# Patient Record
Sex: MALE | Race: OTHER | HISPANIC OR LATINO | URBAN - METROPOLITAN AREA
[De-identification: names, ages, dates, MRNs, and addresses within clinical notes are randomized per-mention and may not be internally consistent; named-entity substitution may affect disease eponyms.]

---

## 2024-10-25 ENCOUNTER — EMERGENCY (EMERGENCY)
Facility: HOSPITAL | Age: 39
LOS: 0 days | Discharge: HOME | End: 2024-10-25
Attending: EMERGENCY MEDICINE
Payer: COMMERCIAL

## 2024-10-25 VITALS
TEMPERATURE: 98 F | WEIGHT: 205.03 LBS | HEIGHT: 70 IN | SYSTOLIC BLOOD PRESSURE: 150 MMHG | RESPIRATION RATE: 20 BRPM | DIASTOLIC BLOOD PRESSURE: 87 MMHG | OXYGEN SATURATION: 97 % | HEART RATE: 94 BPM
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
## 2024-10-25 DIAGNOSIS — S61.012A LACERATION WITHOUT FOREIGN BODY OF LEFT THUMB WITHOUT DAMAGE TO NAIL, INITIAL ENCOUNTER: ICD-10-CM
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
## 2024-10-25 PROBLEM — Z78.9 OTHER SPECIFIED HEALTH STATUS: Chronic | Status: ACTIVE | Noted: 2020-06-21

## 2024-10-25 PROCEDURE — 99282 EMERGENCY DEPT VISIT SF MDM: CPT | Mod: 25

## 2024-10-25 PROCEDURE — 12001 RPR S/N/AX/GEN/TRNK 2.5CM/<: CPT

## 2024-10-25 PROCEDURE — 99284 EMERGENCY DEPT VISIT MOD MDM: CPT | Mod: 25

## 2024-10-25 NOTE — ED STATDOCS - PATIENT PORTAL LINK FT
You can access the FollowMyHealth Patient Portal offered by [facility] by registering at the following website: [email or web address]. By joining [employer]’s FollowMyHealth portal, you will also be able to view your health information using other applications (apps) compatible with our system.

## 2024-10-25 NOTE — ED STATDOCS - OBJECTIVE STATEMENT
38-year-old male denies past medical history presents with left thumb laceration.  Patient was using cutting drill bit and hit hand.  Happened just prior to arrival.  Last tetanus 3 years ago.

## 2024-10-25 NOTE — ED STATDOCS - PHYSICAL EXAMINATION
Gen: Well appearing in NAD  Head: NC/AT  Neck: Trachea midline  Resp: No distress  Ext: Left thumb 1.5 cm laceration on distal phalanx dorsal aspect, 2.5 cm superficial laceration on proximal phalanx, 1 cm laceration on proximal phalanx, neurovascularly intact hemostatic  neuro: A&O appears non focal  Skin: Warm and dry as visualized  Psych: Normal affect and mood

## 2024-10-25 NOTE — ED STATDOCS - PROGRESS NOTE DETAILS
Laceration repaired without complication wound care and signs and symptoms of infection discussed will discharge home with outpatient follow-up with primary care provider for wound check.  Sutures are dissolvable and.  Patient agreeable to discharge and plan of care  Anne-Marie Brandon PA-C Patient seen and evaluated, ED attending note and orders reviewed, will continue with patient follow up and care -Anne-Marie Brandon PA-C

## 2024-10-25 NOTE — ED ADULT TRIAGE NOTE - CHIEF COMPLAINT QUOTE
Accidentally cut himself on sharp tool while working. Obtained laceration to left hand, thumb. Bleeding controlled PTA, redressed at triage. Last Tetanus unknown.

## 2025-06-12 ENCOUNTER — OFFICE (OUTPATIENT)
Dept: URBAN - METROPOLITAN AREA CLINIC 102 | Facility: CLINIC | Age: 40
Setting detail: OPHTHALMOLOGY
End: 2025-06-12
Payer: COMMERCIAL

## 2025-06-12 ENCOUNTER — RX ONLY (RX ONLY)
Age: 40
End: 2025-06-12

## 2025-06-12 DIAGNOSIS — H40.013: ICD-10-CM

## 2025-06-12 DIAGNOSIS — H11.043: ICD-10-CM

## 2025-06-12 DIAGNOSIS — H16.223: ICD-10-CM

## 2025-06-12 PROCEDURE — 92020 GONIOSCOPY: CPT | Performed by: OPHTHALMOLOGY

## 2025-06-12 PROCEDURE — 92250 FUNDUS PHOTOGRAPHY W/I&R: CPT | Performed by: OPHTHALMOLOGY

## 2025-06-12 PROCEDURE — 92083 EXTENDED VISUAL FIELD XM: CPT | Performed by: OPHTHALMOLOGY

## 2025-06-12 PROCEDURE — 92004 COMPRE OPH EXAM NEW PT 1/>: CPT | Performed by: OPHTHALMOLOGY

## 2025-06-12 PROCEDURE — 76514 ECHO EXAM OF EYE THICKNESS: CPT | Performed by: OPHTHALMOLOGY

## 2025-06-12 ASSESSMENT — PACHYMETRY
OD_CT_UM: 603
OS_CT_UM: 620
OS_CT_CORRECTION: -6
OD_CT_CORRECTION: -4

## 2025-06-12 ASSESSMENT — REFRACTION_AUTOREFRACTION
OS_CYLINDER: -0.25
OS_AXIS: 110
OS_SPHERE: 0.00
OD_AXIS: 014
OD_CYLINDER: -0.50
OD_SPHERE: 0.00

## 2025-06-12 ASSESSMENT — CORNEAL PTERYGIUM
OS_PTERYGIUM: 1MM
OD_PTERYGIUM: 1MM

## 2025-06-12 ASSESSMENT — KERATOMETRY
OS_K2POWER_DIOPTERS: 44.75
OS_K1POWER_DIOPTERS: 44.75
OD_AXISANGLE_DEGREES: 098
OD_K2POWER_DIOPTERS: 45.50
OD_K1POWER_DIOPTERS: 44.75
OS_AXISANGLE_DEGREES: 090
METHOD_AUTO_MANUAL: AUTO

## 2025-06-12 ASSESSMENT — VISUAL ACUITY
OS_BCVA: 20/20-2
OD_BCVA: 20/25+2

## 2025-06-12 ASSESSMENT — TONOMETRY
OD_IOP_MMHG: 21
OD_IOP_MMHG: 21

## 2025-06-12 ASSESSMENT — CONFRONTATIONAL VISUAL FIELD TEST (CVF)
OS_FINDINGS: FULL
OD_FINDINGS: FULL